# Patient Record
Sex: MALE | Race: OTHER
[De-identification: names, ages, dates, MRNs, and addresses within clinical notes are randomized per-mention and may not be internally consistent; named-entity substitution may affect disease eponyms.]

---

## 2019-11-14 NOTE — BRIEF OPERATIVE NOTE
Immediate Post Operative Note


Operative Note


Chief Complaint:  Lower back pain with radiation into the legs


Pre-op Diagnosis:


lumbosacral spondylosis


Procedure:


Bilateral L4-L5 and L5-S1 intra-articular facet injection with platelet rich 

plasma.


Post-op Diagnosis:


Lumbosacral spondylosis with radiculopathy


Post-op Diagnosis:  same as pre-op


Findings:  consistent w/pre-op dx studies


Surgeon:  Jennifer Mora MD


Assistant:  None


Additional Surgeons:  None


Anesthesiologist:  None


Anesthesia:  local


Specimen:  none


Complications:  none


Condition:  stable


Fluids:  none


Estimated Blood Loss:  none


Drains:  none


Packing:  none


Tourniquet time:  0


Implant(s) used?:  Jennifer Reyna MD Nov 14, 2019 09:18

## 2019-11-14 NOTE — DIAGNOSTIC IMAGING REPORT
Indication: Intraoperative imaging

 

COMPARISON: None

 

FINDINGS:

 

Multiple  fluoroscopic images were obtained intraoperatively during the lumbar

epidural procedure.

 

Fluoroscopic time 44 seconds.

 

Fluoroscopic images demonstrating L4-5 and L5-S1 epidural injections. This appears to

be bilateral.

 

IMPRESSION:

 

Intraoperative imaging as described above
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
## 2019-11-14 NOTE — PRE-PROCEDURE NOTE/ATTESTATION
Pre-Procedure Note/Attestation


Complete Prior to Procedure


Planned Procedure:  bilateral


Procedure Narrative:


L4-L5 and L5-S1 intra-articular facet injection.





Indications for Procedure


Pre-Operative Diagnosis:


lumbosacral spondylosis





Attestation


I attest that I discussed the nature of the procedure; its benefits; risks and 

complications; and alternatives (and the risks and benefits of such alternatives

), prior to the procedure, with the patient (or the patient's legal 

representative).





I attest that, if there was a reasonable possibility of needing a blood 

transfusion, the patient (or the patient's legal representative) was given the 

[facility] standardized written summary, pursuant 

to the [place] Blood Safety Act ([place] Health and Safety Code # 1645, as 

amended).





I attest that I re-evaluated the patient just prior to the surgery and that 

there has been no change in the patient's H&P, except as documented below:











Jennifer Mora MD Nov 14, 2019 08:43
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
## 2020-02-13 ENCOUNTER — HOSPITAL ENCOUNTER (OUTPATIENT)
Dept: HOSPITAL 72 - RAD | Age: 35
Discharge: HOME | End: 2020-02-13
Payer: COMMERCIAL

## 2020-02-13 VITALS — DIASTOLIC BLOOD PRESSURE: 80 MMHG | SYSTOLIC BLOOD PRESSURE: 120 MMHG

## 2020-02-13 VITALS — DIASTOLIC BLOOD PRESSURE: 70 MMHG | SYSTOLIC BLOOD PRESSURE: 123 MMHG

## 2020-02-13 VITALS — SYSTOLIC BLOOD PRESSURE: 120 MMHG | DIASTOLIC BLOOD PRESSURE: 80 MMHG

## 2020-02-13 VITALS — WEIGHT: 203 LBS | HEIGHT: 72 IN | BODY MASS INDEX: 27.5 KG/M2

## 2020-02-13 DIAGNOSIS — M54.17: Primary | ICD-10-CM

## 2020-02-13 PROCEDURE — 62323 NJX INTERLAMINAR LMBR/SAC: CPT

## 2020-02-13 PROCEDURE — 64484 NJX AA&/STRD TFRM EPI L/S EA: CPT

## 2020-02-13 NOTE — PRE-PROCEDURE NOTE/ATTESTATION
Pre-Procedure Note/Attestation


Complete Prior to Procedure


Planned Procedure:  bilateral


Procedure Narrative:


bilateral L4-5 and L5-S1 intra-articular facet injections with PRP





Indications for Procedure


Pre-Operative Diagnosis:


Lumbosacral spondylosis with radiculopathy





Attestation


I attest that I discussed the nature of the procedure; its benefits; risks and 

complications; and alternatives (and the risks and benefits of such alternatives

), prior to the procedure, with the patient (or the patient's legal 

representative).





I attest that, if there was a reasonable possibility of needing a blood 

transfusion, the patient (or the patient's legal representative) was given the 

[place] [facility] standardized written summary, pursuant 

to the [place] Blood Safety Act ([place] Health and Safety Code # 1645, as 

amended).





I attest that I re-evaluated the patient just prior to the surgery and that 

there has been no change in the patient's H&P, except as documented below:











Jennifer Mora MD Feb 13, 2020 09:00

## 2020-02-13 NOTE — DISCHARGE SUMMARY
Discharge Summary


Hospital Course


Date of Admission


02/13/2020


Date of Discharge


02/13/2020


Admitting Diagnosis


Lumbosacral spondylosis with radiculopathy.


Reason for Hospitalization:  short stay


HPI


Braxton Macias is a 34 year old male who was admitted on  for Spondylosis 

without myelopathy or radiculopathy,


Consultations


none


Procedures


bilateral L4-5 and L5-S1 intra-articular facet injections with PRP


Hospital Course


short stay





Discharge Medications


Medication Profile:  No Active Prescriptions or Reported Meds





Discharge


Condition Upon Discharge:  stable


Discharge Vital Signs





Last Vital Signs








  Date Time  Temp Pulse Resp B/P (MAP) Pulse Ox O2 Delivery O2 Flow Rate FiO2


 


2/13/20 09:12      Room Air  


 


2/13/20 09:05 97.9 64 18 120/80 98   








Discharge Disposition


Patient was discharged to home with wife.


Discharge Diagnoses:  


(1) Lumbosacral spondylosis with radiculopathy





Discharge Instructions


Discharge Instructions


Assessment


stable.


Follow up with:  Dr. Mora


Diet:  regular


Activity:  as tolerated, okay to shower


Special Instructions


No NSAIDS or ice for 2 weeks.  No steroids for 2 weeks.





For Surgical Patients


Dressing Care:  may change


Contact your physician for:  bleeding, pain, tenderness, redness, swelling, 

yellowish discharge in the op. site











Jennifer Mora MD Feb 13, 2020 10:05

## 2020-02-13 NOTE — OPERATIVE NOTE - PDOC
Operative Note


Operative Note


Date of Operation/Procedure:  Feb 13, 2020


Chief Complaint:  Lower back pain with radiation into the bilateral groin.


Pre-op Diagnosis:


Lumbosacral spondylosis with radiculopathy


Procedure:


bilateral L4-5 and L5-S1 intra-articular facet injections with PRP


Post-op Diagnosis:


Lumbosacral spondylosis with radiculopathy.


Post-op Diagnosis:  same as pre-op


Operative Findings:  consistent w/pre-op dx studies


Surgeon:  Jennifer Mora MD


Assistant:  none


Additional Surgeons:  none


Anesthesiologist:  none


Anesthesia:  local


Specimen:  none


Complications:  none


Condition:  stable


Fluids:  none


Estimated Blood Loss:  minimal - for PRP


Drains:  none


Packing:  none


Tourniquet time:  0 - min


Implant(s) used?:  No


Indications for Procedure


The patient has a date of loss of Jan 9, 2019.  He has chronic lower back pain 

with radiation into his bilateral groin.  He failed conservative treatment and 

is here for his second PRP injections into his lower two facet joints 

bilaterally.


Description of Procedure


The patient was seen and identified in the preoperative area. Risks, benefits, 

complications, and alternatives were discussed with the patient. The preop 

nurse removed 10 mL of blood from his arm and it was placed in the centrifuge 

for 10 min at 4000 RPM.


The patient agreed to proceed with the procedure and signed the consent. The 

patient was placed in the prone position, and lumbosacral area was prepped with 

Betadine and draped in the usual sterile fashion. Critical pause was taken. 

Using right oblique fluoroscopy, the right L4-L5 and L5-S1 facet joints were 

identified, and skin and deeper tissues were anesthetized with 1% lidocaine 

mixed with epi. A 25-gauge 3.5-inch spinal needles was guided intra-articularly 

by fluoroscopy to the L4-L5 joint. The second needle was guided intra-

articularly by fluoroscopy to the L5-S1 joint. Tip position was confirmed on 

lateral fluoroscopy. After negative aspiration of CSF and blood with no 

paresthesias, 0.5mL of Isoview 300 was injected illustrating excellent 

arthrogram. Again after negative aspiration of CSF and blood with no 

paresthesias, 1.5 mL of platelet rich plasma was injected into each joint. The 

fluoroscopic camera was then placed in the left oblique position and the same 

procedure was repeated on the left side.  A total of 6 mL of PRP was injected.


The needles were removed, skin was cleansed, and bandages were applied. The 

patient tolerated the procedure well without complications, and was discharged 

from recovery room after meeting discharge.











Jennifer Mora MD Feb 13, 2020 10:15

## 2020-02-13 NOTE — BRIEF OPERATIVE NOTE
Immediate Post Operative Note


Operative Note


Chief Complaint:  Lower back pain with radiation into the bilateral groin.


Pre-op Diagnosis:


Lumbosacral spondylosis with radiculopathy


Procedure:


bilateral L4-5 and L5-S1 intra-articular facet injections with PRP


Post-op Diagnosis:


Lumbosacral spondylosis with radiculopathy.


Post-op Diagnosis:  same as pre-op


Findings:  consistent w/pre-op dx studies


Surgeon:  Jennifer Mora MD


Assistant:  none


Additional Surgeons:  none


Anesthesiologist:  none


Anesthesia:  local


Specimen:  none


Complications:  none


Condition:  stable


Fluids:  none


Estimated Blood Loss:  minimal - for PRP


Drains:  none


Packing:  none


Tourniquet time:  0 - min


Implant(s) used?:  Jennifer Reyna MD Feb 13, 2020 10:02

## 2020-02-13 NOTE — SHORT STAY SURGERY H&P
History of Present Illness


History of Present Illness


Chief Complaint


Lower back pain with radiation into the groin bilaterally.


HPI


Braxton Macias is a 34 year old male who was admitted on  for Spondylosis 

without myelopathy or radiculopathy,





Patient History


Allergies:  


Coded Allergies:  


     Shrimp (Verified  Allergy, Unknown, 11/14/19)


PAST MEDICAL HISTORY:  


(1) Lumbosacral spondylosis with radiculopathy


Patient History Narrative


The patient has a date of loss of Jan 9, 2019 and has developed chronic lower 

back pain with radiation into the bilateral groin due to disc displacements and 

facet pain.  He failed conservative management as underwent bilateral L4-5 and 

L5-S1 intra-articular facet injections with PRP.  He has had at least a 50% 

improvement in function and decrease in pain.  He is here for his second set of 

injections.





Medication History


Scheduled


Finasteride* (Proscar*), 5 MG ORAL DAILY, (Reported)





Review of Systems


Cardiovascular:  Denies: no symptoms, see HPI, hypertension, CAD - stable, 

angina, MI, CABG, dysrhythmia, CHF, valvular disease, rheumatic heart disease, 

peripheral vascular disease, source of infx - skin, source of infx-indw cath, 

source of infx-prosthesis, other


Respiratory:  Denies: no symptoms, see HPI, asthma, chronic bronchitis, 

pneumonia, COPD, URI, tuberculosis, sleep apnea, CPAP, home 02, other


Skeletal:  Reports: spinal disc disease, trauma


Gastrointestinal:  Denies: no symptoms, see HPI, obesity, peptic ulcer disease, 

gastro esophageal reflux disease, hiatal hernia, jaundice, hepatitis A,B,C, 

other


Genitourinary:  Denies: no symptoms, see HPI, renal insufficiency, endstage 

renal disease, dialysis, UTI, urinary retention, BPH, other


Neurologic:  Reports: other - radiating pain into  the bilateral groin


Endocrine:  Denies: no symptoms, see HPI, diabetes - type 1, diabetes - type 2, 

thyroid, post menopausal, other


Hematologic:  Denies: no symptoms, see HPI, anemia, coagulopathy, prior 

transfusion, other





Plan


Attestation


Are the patient's medical conditions optimized for surgery?











Jennifer Mora MD Feb 13, 2020 08:59

## 2020-02-17 NOTE — DIAGNOSTIC IMAGING REPORT
INDICATION:  Pain, intraoperative

 

TECHNIQUE: Intraoperative imaging

 

Fluoroscopy time: 36.8 seconds

Total dose: 0.51854 mGym2

Total number of images: One

 

COMPARISON: None

 

FINDINGS: Intraoperative images demonstrate multiple perineuralforaminal contrast

injections in the lumbosacral region

 

IMPRESSION: Intraoperative imaging as described n/a

## 2020-03-02 NOTE — DIAGNOSTIC IMAGING REPORT
INDICATION:  Pain, intraoperative

 

TECHNIQUE: Intraoperative imaging

 

Fluoroscopy time: 36.8 seconds

Total dose: 0.34065 mGym2

Total number of images: One

 

COMPARISON: None

 

FINDINGS: Intraoperative images demonstrate multiple perineuralforaminal contrast

injections in the lumbosacral region

 

IMPRESSION: Intraoperative imaging as described